# Patient Record
Sex: MALE | Race: WHITE
[De-identification: names, ages, dates, MRNs, and addresses within clinical notes are randomized per-mention and may not be internally consistent; named-entity substitution may affect disease eponyms.]

---

## 2018-02-20 ENCOUNTER — HOSPITAL ENCOUNTER (EMERGENCY)
Dept: HOSPITAL 76 - ED | Age: 58
Discharge: HOME | End: 2018-02-20
Payer: COMMERCIAL

## 2018-02-20 VITALS — DIASTOLIC BLOOD PRESSURE: 77 MMHG | SYSTOLIC BLOOD PRESSURE: 143 MMHG

## 2018-02-20 DIAGNOSIS — F17.200: ICD-10-CM
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
## 2018-02-20 DIAGNOSIS — S61.412A: Primary | ICD-10-CM

## 2018-02-20 PROCEDURE — 12002 RPR S/N/AX/GEN/TRNK2.6-7.5CM: CPT

## 2018-02-20 PROCEDURE — 99283 EMERGENCY DEPT VISIT LOW MDM: CPT
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
## 2018-10-10 ENCOUNTER — HOSPITAL ENCOUNTER (OUTPATIENT)
Dept: HOSPITAL 76 - DI.N | Age: 58
Discharge: HOME | End: 2018-10-10
Attending: FAMILY MEDICINE
Payer: COMMERCIAL

## 2018-10-10 DIAGNOSIS — M79.645: Primary | ICD-10-CM

## 2018-10-10 DIAGNOSIS — M25.842: ICD-10-CM

## 2018-10-10 PROCEDURE — 73140 X-RAY EXAM OF FINGER(S): CPT
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
## 2019-04-02 ENCOUNTER — HOSPITAL ENCOUNTER (OUTPATIENT)
Dept: HOSPITAL 76 - EMS | Age: 59
Discharge: ANOTHER HOSPITAL | End: 2019-04-02
Attending: SURGERY
Payer: COMMERCIAL

## 2019-04-02 DIAGNOSIS — S71.101A: Primary | ICD-10-CM
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
## 2020-01-14 ENCOUNTER — HOSPITAL ENCOUNTER (OUTPATIENT)
Dept: HOSPITAL 76 - EMS | Age: 60
Discharge: ANOTHER HOSPITAL | End: 2020-01-14
Attending: SURGERY
Payer: COMMERCIAL

## 2020-01-14 ENCOUNTER — HOSPITAL ENCOUNTER (EMERGENCY)
Dept: HOSPITAL 76 - ED | Age: 60
Discharge: ANOTHER HOSPITAL | End: 2020-01-14
Payer: COMMERCIAL

## 2020-01-14 VITALS — DIASTOLIC BLOOD PRESSURE: 87 MMHG | SYSTOLIC BLOOD PRESSURE: 144 MMHG

## 2020-01-14 DIAGNOSIS — F17.200: ICD-10-CM

## 2020-01-14 DIAGNOSIS — I21.3: Primary | ICD-10-CM

## 2020-01-14 LAB
ALBUMIN DIAFP-MCNC: 4.4 G/DL (ref 3.2–5.5)
ALBUMIN/GLOB SERPL: 1.2 {RATIO} (ref 1–2.2)
ALP SERPL-CCNC: 76 IU/L (ref 42–121)
ALT SERPL W P-5'-P-CCNC: 19 IU/L (ref 10–60)
ANION GAP SERPL CALCULATED.4IONS-SCNC: 10 MMOL/L (ref 6–13)
AST SERPL W P-5'-P-CCNC: 20 IU/L (ref 10–42)
BASOPHILS NFR BLD AUTO: 0.1 10^3/UL (ref 0–0.1)
BASOPHILS NFR BLD AUTO: 0.3 %
BILIRUB BLD-MCNC: 0.5 MG/DL (ref 0.2–1)
BUN SERPL-MCNC: 11 MG/DL (ref 6–20)
CALCIUM UR-MCNC: 9 MG/DL (ref 8.5–10.3)
CHLORIDE SERPL-SCNC: 101 MMOL/L (ref 101–111)
CO2 SERPL-SCNC: 23 MMOL/L (ref 21–32)
CREAT SERPLBLD-SCNC: 0.8 MG/DL (ref 0.6–1.2)
EOSINOPHIL # BLD AUTO: 0.1 10^3/UL (ref 0–0.7)
EOSINOPHIL NFR BLD AUTO: 0.3 %
ERYTHROCYTE [DISTWIDTH] IN BLOOD BY AUTOMATED COUNT: 12.4 % (ref 12–15)
GFRSERPLBLD MDRD-ARVRAT: 99 ML/MIN/{1.73_M2} (ref 89–?)
GLOBULIN SER-MCNC: 3.6 G/DL (ref 2.1–4.2)
GLUCOSE SERPL-MCNC: 157 MG/DL (ref 70–100)
HGB UR QL STRIP: 15.8 G/DL (ref 14–18)
LIPASE SERPL-CCNC: 23 U/L (ref 22–51)
LYMPHOCYTES # SPEC AUTO: 1.8 10^3/UL (ref 1.5–3.5)
LYMPHOCYTES NFR BLD AUTO: 12.3 %
MCH RBC QN AUTO: 30.6 PG (ref 27–31)
MCHC RBC AUTO-ENTMCNC: 32.9 G/DL (ref 32–36)
MCV RBC AUTO: 92.8 FL (ref 80–94)
MONOCYTES # BLD AUTO: 0.6 10^3/UL (ref 0–1)
MONOCYTES NFR BLD AUTO: 4 %
NEUTROPHILS # BLD AUTO: 12 10^3/UL (ref 1.5–6.6)
NEUTROPHILS # SNV AUTO: 14.5 X10^3/UL (ref 4.8–10.8)
NEUTROPHILS NFR BLD AUTO: 82.5 %
PDW BLD AUTO: 8.6 FL (ref 7.4–11.4)
PLATELET # BLD: 271 10^3/UL (ref 130–450)
PROT SPEC-MCNC: 8 G/DL (ref 6.7–8.2)
RBC MAR: 5.17 10^6/UL (ref 4.7–6.1)
SODIUM SERPLBLD-SCNC: 134 MMOL/L (ref 135–145)

## 2020-01-14 PROCEDURE — 85025 COMPLETE CBC W/AUTO DIFF WBC: CPT

## 2020-01-14 PROCEDURE — 83690 ASSAY OF LIPASE: CPT

## 2020-01-14 PROCEDURE — 84484 ASSAY OF TROPONIN QUANT: CPT

## 2020-01-14 PROCEDURE — 71045 X-RAY EXAM CHEST 1 VIEW: CPT

## 2020-01-14 PROCEDURE — 96374 THER/PROPH/DIAG INJ IV PUSH: CPT

## 2020-01-14 PROCEDURE — 93005 ELECTROCARDIOGRAM TRACING: CPT

## 2020-01-14 PROCEDURE — 80053 COMPREHEN METABOLIC PANEL: CPT

## 2020-01-14 PROCEDURE — 99284 EMERGENCY DEPT VISIT MOD MDM: CPT

## 2020-01-14 PROCEDURE — 99285 EMERGENCY DEPT VISIT HI MDM: CPT

## 2020-01-14 PROCEDURE — 36415 COLL VENOUS BLD VENIPUNCTURE: CPT

## 2020-01-14 RX ADMIN — NITROGLYCERIN STA MG: 0.4 TABLET, ORALLY DISINTEGRATING SUBLINGUAL at 13:01

## 2020-01-14 RX ADMIN — NITROGLYCERIN STA MG: 0.4 TABLET, ORALLY DISINTEGRATING SUBLINGUAL at 12:52

## 2020-01-14 NOTE — ED PHYSICIAN DOCUMENTATION
History of Present Illness





- Stated complaint


Stated Complaint: SOA,BILAT ARM/SHOULDER PX,DIZZY - CLINIC REFERRAL





- Chief complaint


Chief Complaint: Cardiac





- Additonal information


Additional information: 





This is a 59-year-old male who presents with chest pain radiating to his 

bilateral arms since last night. This began around dinnertime maybe around 

7:30PM, he describes the pain as a pressure in the center of his chest and he 

has achiness in all deltoids and triceps. He has had a bit of nausea as well, no

vomiting. He is a longtime smoker, he denies any history of diabetes.  He has 

never had a cardiac catheterization.  He Saw his primary care provider this 

morning who sent him here for further evaluation as they did not have an EKG 

machine and he was having chest pain. He currently states his chest pain is 5 

out of 10, and his achiness in his arms is a bit worse at maybe 8-9 out of 10.





Review of Systems


Constitutional: denies: Fever


Cardiac: reports: Chest pain / pressure


Respiratory: denies: Dyspnea


GI: reports: Nausea.  denies: Abdominal Pain


Musculoskeletal: denies: Neck pain


Neurologic: denies: Generalized weakness, Near syncope


Immunocompromised: denies: Immunocompromised





PD PAST MEDICAL HISTORY





- Past Medical History


Respiratory: Pneumonia





- Past Surgical History


Past Surgical History: Yes


HEENT: Rhinoplasty, Tonsil/Adenoidectomy





- Present Medications


Home Medications: 


                                Ambulatory Orders











 Medication  Instructions  Recorded  Confirmed


 


Ibuprofen [Motrin] 400 mg PO Q6H PRN #30 tablet 05/17/14 


 


oxyCODONE/ACET 5/325 [Percocet 5 1 - 2 each PO Q6H PRN #20 tablet 05/17/14 





mg/325 mg]   














- Allergies


Allergies/Adverse Reactions: 


                                    Allergies











Allergy/AdvReac Type Severity Reaction Status Date / Time


 


Penicillins Allergy Mild Rash Verified 01/14/20 12:27














- Social History


Does the pt smoke?: Yes


Smoking Status: Current every day smoker


Does the pt drink ETOH?: No


Does the pt have substance abuse?: No





- Immunizations


Immunizations are current?: Yes





PD ED PE NORMAL





- Vitals


Vital signs reviewed: Yes





- General


General: Alert and oriented X 3, No acute distress





- HEENT


HEENT: PERRL





- Neck


Neck: Supple, no meningeal sign





- Cardiac


Cardiac: RRR, No murmur





- Respiratory


Respiratory: Clear bilaterally





- Abdomen


Abdomen: Soft, Non distended





- Derm


Derm: Warm and dry





- Extremities


Extremities: No deformity





- Neuro


Neuro: Alert and oriented X 3





- Psych


Psych: Normal mood, Normal affect





Results





- Vitals


Vitals: 


                               Vital Signs - 24 hr











  01/14/20 01/14/20





  12:24 12:59


 


Temperature 36.4 C L 


 


Heart Rate 58 L 77


 


Respiratory 18 16





Rate  


 


Blood Pressure 148/106 H 144/87 H


 


O2 Saturation 98 95








                                     Oxygen











O2 Source                      Room air

















- EKG (time done)


  ** 12:31


Other comments: Other comments (Rate 57, rhythm sinus, there is ST elevation in 

the anteriorly leads and aVL, and depressions inferiorly.  This is a STEMI.)





- Labs


Labs: 


                                Laboratory Tests











  01/14/20 01/14/20 01/14/20





  12:45 12:45 12:45


 


WBC  14.5 H  


 


RBC  5.17  


 


Hgb  15.8  


 


Hct  48.0  


 


MCV  92.8  


 


MCH  30.6  


 


MCHC  32.9  


 


RDW  12.4  


 


Plt Count  271  


 


MPV  8.6  


 


Neut # (Auto)  12.0 H  


 


Lymph # (Auto)  1.8  


 


Mono # (Auto)  0.6  


 


Eos # (Auto)  0.1  


 


Baso # (Auto)  0.1  


 


Absolute Nucleated RBC  0.00  


 


Nucleated RBC %  0.0  


 


Sodium   134 L 


 


Potassium   3.7 


 


Chloride   101 


 


Carbon Dioxide   23 


 


Anion Gap   10.0 


 


BUN   11 


 


Creatinine   0.8 


 


Estimated GFR (MDRD)   99 


 


Glucose   157 H 


 


Calcium   9.0 


 


Total Bilirubin   0.5 


 


AST   20 


 


ALT   19 


 


Alkaline Phosphatase   76 


 


Troponin I High Sens    341.3 H*


 


Total Protein   8.0 


 


Albumin   4.4 


 


Globulin   3.6 


 


Albumin/Globulin Ratio   1.2 


 


Lipase   23 














- Rads (name of study)


  ** CXR


Radiology: Other (Borderline cardiomegaly, increased perihilar opacity which may

represent plethoric pulmonary vasculature, no infiltrate or pneumothorax)





PD MEDICAL DECISION MAKING





- ED course


Complexity details: considered differential (ACS, STEMI,Pneumothorax, pleural 

effusion, dysrhythmia, musculoskeletal pain)


ED course: 


Patient arrived via private vehicle for chest pain, with a concerning history of

pain radiating to both arms.  He had an EKG done in triage, and this shows an 

anterior lateral STEMI.  He was placed on the monitor and pads, IV access was 

obtained labs are drawn he was given 324 mg of aspirin p.o., and started on a 

heparin drip.  He is also given nitroglycerin as he is having chest pain and his

blood pressure is a bit hypertensive at this time. The nitroglycerin did help 

with his pain. I contacted the [facility] emergency department immediately after 

reading the EKG, I spoke with Dr. Zayas who accepted the patient in transfer. 





His last p.o. intake was coffee this morning around 7 AM.  I explained the plan 

of care and patient is in agreement he was transferred via ALS. He is still hav

ing some pain, the medics were given morphine in route.  He is hemodynamically 

stable. Labs returned after patient was transferred, he has leukocytosis which 

is nonspecific, he does have an elevated HS troponin at 341, consistent with his

MI.














Departure





- Departure


Disposition: 02 Transfer Acute Care Hosp


Clinical Impression: 


STEMI (ST elevation myocardial infarction)


Qualifiers:


 Involved coronary artery: unspecified coronary artery Qualified Code(s): I21.3 

- ST elevation (STEMI) myocardial infarction of unspecified site





Condition: Stable


Discharge Date/Time: 01/14/20 13:15

## 2020-01-14 NOTE — XRAY REPORT
Reason:  Chest pain

Procedure Date:  01/14/2020   

Accession Number:  [id number] / [id number]                    

Procedure:  XR  - Chest 1 View X-Ray CPT Code:  96227

 

***Final Report***

 

 

FULL RESULT:

 

 

EXAM:

CHEST RADIOGRAPHY

 

EXAM DATE: 1/14/2020 12:58 PM.

 

CLINICAL HISTORY: Chest pain.

 

COMPARISON: CHEST 2 VIEW PA/LAT 05/17/2014 9:56 AM.

 

TECHNIQUE: 1 view.

 

FINDINGS:

Lungs/Pleura: There is increase in perihilar reticular opacity. No 

evidence of lowering the train or effusion. There is no pneumothorax.

 

Mediastinum: Borderline cardiomegaly.

 

Other: Multiple rounded radiopacities projecting over the chest.

 

IMPRESSION:

1. Borderline cardiomegaly.

2. There is increased perihilar opacity which may represent plethoric 

pulmonary vasculature; no direct evidence of significant lung edema.

3. No lobar infiltrate or pneumothorax.

 

RADIA

## 2020-03-13 ENCOUNTER — HOSPITAL ENCOUNTER (OUTPATIENT)
Dept: HOSPITAL 76 - EMS | Age: 60
Discharge: ANOTHER HOSPITAL | End: 2020-03-13
Attending: SURGERY
Payer: COMMERCIAL

## 2020-03-13 DIAGNOSIS — R42: ICD-10-CM

## 2020-03-13 DIAGNOSIS — R53.1: ICD-10-CM

## 2020-03-13 DIAGNOSIS — R50.9: ICD-10-CM

## 2020-03-13 DIAGNOSIS — R00.2: Primary | ICD-10-CM

## 2020-03-25 ENCOUNTER — HOSPITAL ENCOUNTER (OUTPATIENT)
Dept: HOSPITAL 76 - LAB | Age: 60
Discharge: HOME | End: 2020-03-25
Attending: INTERNAL MEDICINE
Payer: COMMERCIAL

## 2020-03-25 DIAGNOSIS — R31.0: Primary | ICD-10-CM

## 2020-03-25 LAB
CLARITY UR REFRACT.AUTO: CLEAR
GLUCOSE UR QL STRIP.AUTO: NEGATIVE MG/DL
KETONES UR QL STRIP.AUTO: NEGATIVE MG/DL
NITRITE UR QL STRIP.AUTO: NEGATIVE
PH UR STRIP.AUTO: 6 PH (ref 5–7.5)
PROT UR STRIP.AUTO-MCNC: NEGATIVE MG/DL
RBC # UR STRIP.AUTO: (no result) /UL
RBC # URNS HPF: (no result) /HPF (ref 0–5)
SP GR UR STRIP.AUTO: 1.01 (ref 1–1.03)
SQUAMOUS URNS QL MICRO: (no result)
UROBILINOGEN UR QL STRIP.AUTO: (no result) E.U./DL
UROBILINOGEN UR STRIP.AUTO-MCNC: NEGATIVE MG/DL

## 2020-03-25 PROCEDURE — 81001 URINALYSIS AUTO W/SCOPE: CPT

## 2021-04-21 ENCOUNTER — HOSPITAL ENCOUNTER (OUTPATIENT)
Dept: HOSPITAL 76 - DI | Age: 61
Discharge: HOME | End: 2021-04-21
Attending: OTOLARYNGOLOGY
Payer: COMMERCIAL

## 2021-04-21 DIAGNOSIS — C02.9: Primary | ICD-10-CM

## 2021-04-21 DIAGNOSIS — C76.0: ICD-10-CM

## 2021-04-21 PROCEDURE — 70491 CT SOFT TISSUE NECK W/DYE: CPT
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
## 2023-01-06 ENCOUNTER — HOSPITAL ENCOUNTER (EMERGENCY)
Dept: HOSPITAL 76 - ED | Age: 63
Discharge: HOME | End: 2023-01-06
Payer: COMMERCIAL

## 2023-01-06 VITALS — SYSTOLIC BLOOD PRESSURE: 132 MMHG | DIASTOLIC BLOOD PRESSURE: 74 MMHG

## 2023-01-06 DIAGNOSIS — J44.9: ICD-10-CM

## 2023-01-06 DIAGNOSIS — F17.200: ICD-10-CM

## 2023-01-06 DIAGNOSIS — U07.1: Primary | ICD-10-CM

## 2023-01-06 PROCEDURE — 99282 EMERGENCY DEPT VISIT SF MDM: CPT

## 2023-01-06 PROCEDURE — 99283 EMERGENCY DEPT VISIT LOW MDM: CPT

## 2023-01-06 NOTE — ED PHYSICIAN DOCUMENTATION
PD HPI DYSPNEA





- Stated complaint


Stated Complaint: C+,FATIGUE





- Chief complaint


Chief Complaint: Resp





- History obtained from


History obtained from: Patient





- Additional information


Additional information: 





62-year-old gentleman with history of COPD but not on any medications presents 

with symptomatic COVID starting this morning with a positive test at home and 

was advised to come here for packs of it.  He is not short of breath.





Review of Systems


Constitutional: reports: Chills, Myalgias


Respiratory: denies: Dyspnea, Cough





PD PAST MEDICAL HISTORY





- Past Medical History


Respiratory: Pneumonia





- Past Surgical History


Past Surgical History: Yes


HEENT: Rhinoplasty, Tonsil/Adenoidectomy





- Present Medications


Home Medications: 


                                Ambulatory Orders











 Medication  Instructions  Recorded  Confirmed


 


Ibuprofen [Motrin] 400 mg PO Q6H PRN #30 tablet 05/17/14 


 


oxyCODONE/ACET 5/325 [Percocet 5 1 - 2 each PO Q6H PRN #20 tablet 05/17/14 





mg/325 mg]   














- Allergies


Allergies/Adverse Reactions: 


                                    Allergies











Allergy/AdvReac Type Severity Reaction Status Date / Time


 


Penicillins Allergy Mild Rash Verified 01/06/23 19:23














- Social History


Does the pt smoke?: Yes


Smoking Status: Current every day smoker


Does the pt drink ETOH?: No


Does the pt have substance abuse?: No





- Immunizations


Immunizations are current?: Yes





PD ED PE NORMAL





- Vitals


Vital signs reviewed: Yes





- General


General: Alert and oriented X 3, No acute distress





- Respiratory


Respiratory: No respiratory distress





- Neuro


Neuro: Alert and oriented X 3, Normal speech





Results





- Vitals


Vitals: 


                               Vital Signs - 24 hr











  01/06/23





  19:14


 


Temperature 36.4 C L


 


Heart Rate 81


 


Respiratory 17





Rate 


 


Blood Pressure 132/74 H


 


O2 Saturation 96








                                     Oxygen











O2 Source                      Room air

















PD Medical Decision Making





- ED course


ED course: 





62-year-old gentleman presents with symptomatic COVID albeit not short of breath

nor hypoxic requesting antiviral therapy.  He is not on any home medications malika

t would interact with antivirals and was dispensed Paxlovid.





Departure





- Departure


Disposition: 01 Home, Self Care


Clinical Impression: 


 COVID-19





Condition: Good


Record reviewed to determine appropriate education?: Yes


Instructions:  ED Viral Syndrome


Comments: 


You are seen today for COVID.  You need to quarantine per CDC guidelines.  You 

received a course of paxlovid antiviral medication.  Return if worse.


Discharge Date/Time: 01/06/23 20:09